# Patient Record
Sex: MALE | Race: WHITE | Employment: UNEMPLOYED | URBAN - NONMETROPOLITAN AREA
[De-identification: names, ages, dates, MRNs, and addresses within clinical notes are randomized per-mention and may not be internally consistent; named-entity substitution may affect disease eponyms.]

---

## 2024-07-17 ENCOUNTER — HOSPITAL ENCOUNTER (EMERGENCY)
Age: 44
Discharge: HOME | End: 2024-07-17
Attending: EMERGENCY MEDICINE
Payer: COMMERCIAL

## 2024-07-17 VITALS
BODY MASS INDEX: 24.34 KG/M2 | SYSTOLIC BLOOD PRESSURE: 148 MMHG | RESPIRATION RATE: 18 BRPM | TEMPERATURE: 98.3 F | HEART RATE: 88 BPM | WEIGHT: 170 LBS | DIASTOLIC BLOOD PRESSURE: 92 MMHG | HEIGHT: 70 IN | OXYGEN SATURATION: 98 %

## 2024-07-17 DIAGNOSIS — K05.10 GINGIVITIS: ICD-10-CM

## 2024-07-17 DIAGNOSIS — K08.89 DENTALGIA: Primary | ICD-10-CM

## 2024-07-17 PROCEDURE — 6370000000 HC RX 637 (ALT 250 FOR IP): Performed by: EMERGENCY MEDICINE

## 2024-07-17 PROCEDURE — 99283 EMERGENCY DEPT VISIT LOW MDM: CPT

## 2024-07-17 RX ORDER — AMOXICILLIN 250 MG/1
500 CAPSULE ORAL ONCE
Status: COMPLETED | OUTPATIENT
Start: 2024-07-17 | End: 2024-07-17

## 2024-07-17 RX ORDER — AMOXICILLIN 500 MG/1
500 CAPSULE ORAL 3 TIMES DAILY
Qty: 30 CAPSULE | Refills: 0 | Status: SHIPPED | OUTPATIENT
Start: 2024-07-17 | End: 2024-07-27

## 2024-07-17 RX ADMIN — AMOXICILLIN 500 MG: 250 CAPSULE ORAL at 21:36

## 2024-07-17 ASSESSMENT — ENCOUNTER SYMPTOMS
ABDOMINAL PAIN: 0
SORE THROAT: 0
EYE PAIN: 0
COUGH: 0
RHINORRHEA: 0
SHORTNESS OF BREATH: 0

## 2024-07-17 ASSESSMENT — PAIN SCALES - GENERAL: PAINLEVEL_OUTOF10: 8

## 2024-07-17 ASSESSMENT — PAIN - FUNCTIONAL ASSESSMENT: PAIN_FUNCTIONAL_ASSESSMENT: 0-10

## 2024-07-18 NOTE — ED PROVIDER NOTES
[facility] EMERGENCY DEPT  EMERGENCY DEPARTMENT ENCOUNTER      Pt Name: Brennen Nair  MRN: [id number]  Birthdate 1980  Date of evaluation: 7/17/2024  Provider: Triston Aguirre DO  9:35 PM    CHIEF COMPLAINT       Chief Complaint   Patient presents with    Jaw Pain         HISTORY OF PRESENT ILLNESS    Brennen Nair is a 43 y.o. male who presents to the emergency department with a CC of dental pain.  He states that he is new in town and doesn't have a dentist.  He has poor dentition and was getting his teeth worked on when he lived in [place].  Today, he states pain in the left upper and lower jaw for the last 3 days.  No fever.  He cannot identify an inciting incident, no aggravating or alleviating conditions.  No voice change, has sore throat, but thinks its from smoking a lot of cigarettes.       HPI    Nursing Notes were reviewed.    REVIEW OF SYSTEMS       Review of Systems   Constitutional:  Negative for chills and fever.   HENT:  Positive for dental problem. Negative for rhinorrhea and sore throat.    Eyes:  Negative for pain and visual disturbance.   Respiratory:  Negative for cough and shortness of breath.    Cardiovascular:  Negative for chest pain.   Gastrointestinal:  Negative for abdominal pain.   Endocrine: Negative for polydipsia and polyuria.   Genitourinary:  Negative for dysuria and flank pain.   Musculoskeletal:  Negative for arthralgias and myalgias.   Skin:  Negative for rash and wound.   Neurological:  Negative for dizziness, weakness and light-headedness.   Psychiatric/Behavioral:  Negative for suicidal ideas.        Except as noted above the remainder of the review of systems was reviewed and negative.       PAST MEDICAL HISTORY   History reviewed. No pertinent past medical history.      SURGICAL HISTORY     History reviewed. No pertinent surgical history.      CURRENT MEDICATIONS       Previous Medications    No medications on file       ALLERGIES     Patient has no known

## 2024-07-18 NOTE — ED TRIAGE NOTES
Pt to ED with c/o jaw pain. Pt states pain 8/10. Pt voices that pain started 4 days ago. Pt states he has a history of jaw problems. Pt states the pain is on left side of jaw, radiating to the ear and forehead. Pt chipped tooth a few weeks ago. Pt has even and unlabored respirations.